# Patient Record
Sex: FEMALE | Race: WHITE
[De-identification: names, ages, dates, MRNs, and addresses within clinical notes are randomized per-mention and may not be internally consistent; named-entity substitution may affect disease eponyms.]

---

## 2023-08-21 ENCOUNTER — APPOINTMENT (OUTPATIENT)
Dept: OTOLARYNGOLOGY | Facility: CLINIC | Age: 58
End: 2023-08-21
Payer: MEDICAID

## 2023-08-21 DIAGNOSIS — Z87.891 PERSONAL HISTORY OF NICOTINE DEPENDENCE: ICD-10-CM

## 2023-08-21 DIAGNOSIS — Z82.49 FAMILY HISTORY OF ISCHEMIC HEART DISEASE AND OTHER DISEASES OF THE CIRCULATORY SYSTEM: ICD-10-CM

## 2023-08-21 DIAGNOSIS — Z78.9 OTHER SPECIFIED HEALTH STATUS: ICD-10-CM

## 2023-08-21 DIAGNOSIS — H61.23 IMPACTED CERUMEN, BILATERAL: ICD-10-CM

## 2023-08-21 DIAGNOSIS — H90.3 SENSORINEURAL HEARING LOSS, BILATERAL: ICD-10-CM

## 2023-08-21 DIAGNOSIS — Z80.9 FAMILY HISTORY OF MALIGNANT NEOPLASM, UNSPECIFIED: ICD-10-CM

## 2023-08-21 PROBLEM — Z00.00 ENCOUNTER FOR PREVENTIVE HEALTH EXAMINATION: Status: ACTIVE | Noted: 2023-08-21

## 2023-08-21 PROCEDURE — 99204 OFFICE O/P NEW MOD 45 MIN: CPT | Mod: 25

## 2023-08-21 RX ORDER — AMPHETAMINE SULFATE 5 MG/1
TABLET ORAL
Refills: 0 | Status: ACTIVE | COMMUNITY

## 2023-08-21 RX ORDER — LAMOTRIGINE 100 MG/1
100 TABLET ORAL
Refills: 0 | Status: ACTIVE | COMMUNITY

## 2023-08-21 NOTE — HISTORY OF PRESENT ILLNESS
[de-identified] : COLETTE SANDERS has a history of progressive bilateral hearing loss.  Previously referred for cochlear implants. History of hearing loss likely from childhood.  Diagnosed with hearing loss at the age of 17.  Started using amplification at that time. Left was traditionally the better hearing ear.  Reports tinnitus bilaterally variable. No vertigo. No significant ear infections.  No FH of hearing loss.

## 2023-08-21 NOTE — PHYSICAL EXAM
[FreeTextEntry1] : Microscopic ear exam with cerumen debridement:  Right ear: Obstructing cerumen was debrided from the ear canal using suction, and curet.  The ear canal was otherwise within normal limits.  The tympanic membrane was intact and noninflamed.  Left ear: Obstructing cerumen was debrided from the ear canal using suction, and curets.  The ear canal was otherwise within normal limits.  The tympanic membrane was intact and noninflamed.  [place] Hearing Assessment 512 Hz: Noyola test: referred to the bilateral Rinne test: 	Right Ear: Air Conduction > Bone Conduction 	Left Ear:   Air Conduction > Bone conduction [Normal] : mucosa is normal [Midline] : trachea located in midline position

## 2023-08-21 NOTE — ASSESSMENT
[FreeTextEntry1] : Significant bilateral sensorineural hearing loss.  The patient is using bilateral amplification but has had difficulties.  She apparently had to leave her last job due to her hearing loss.  She has been previously evaluated at [facility] and deemed appropriate for cochlear implantation.  She is considering this option.  I have reviewed these findings with the patient in detail and have reviewed management options.  I have recommended discussion with an implant audiologist for considering cochlear implantation.  All risks limitations, complications and alternatives reviewed in detail.  Questions answered.  Ear hygiene reviewed.  Time based billing: This encounter required more than 45 minutes of time excluding procedures.

## 2023-08-21 NOTE — DATA REVIEWED
[de-identified] : Multiple audiograms provided and reviewed.  Most recently in August 2023 reveals low-frequency preservation of hearing with steeply sloping to severe hearing loss bilaterally.  Speech recognition is not recorded.  Other testing including implant audiology evaluation is reviewed.

## 2023-09-06 ENCOUNTER — APPOINTMENT (OUTPATIENT)
Dept: OTOLARYNGOLOGY | Facility: CLINIC | Age: 58
End: 2023-09-06
Payer: MEDICAID

## 2023-09-06 PROCEDURE — 92626 EVAL AUD FUNCJ 1ST HOUR: CPT

## 2023-09-06 PROCEDURE — 92627 EVAL AUD FUNCJ EA ADDL 15: CPT

## 2023-10-03 ENCOUNTER — NON-APPOINTMENT (OUTPATIENT)
Age: 58
End: 2023-10-03

## 2023-10-20 ENCOUNTER — APPOINTMENT (OUTPATIENT)
Dept: OTOLARYNGOLOGY | Facility: CLINIC | Age: 58
End: 2023-10-20
Payer: SELF-PAY

## 2023-10-20 PROCEDURE — V5261H: CUSTOM

## 2023-11-01 ENCOUNTER — NON-APPOINTMENT (OUTPATIENT)
Age: 58
End: 2023-11-01